# Patient Record
Sex: MALE | Race: WHITE | NOT HISPANIC OR LATINO | URBAN - METROPOLITAN AREA
[De-identification: names, ages, dates, MRNs, and addresses within clinical notes are randomized per-mention and may not be internally consistent; named-entity substitution may affect disease eponyms.]

---

## 2017-01-01 ENCOUNTER — OUTPATIENT (OUTPATIENT)
Dept: OUTPATIENT SERVICES | Facility: HOSPITAL | Age: 67
LOS: 1 days | Discharge: HOME | End: 2017-01-01

## 2017-01-01 ENCOUNTER — INPATIENT (INPATIENT)
Facility: HOSPITAL | Age: 67
LOS: 0 days | End: 2017-05-03
Attending: INTERNAL MEDICINE | Admitting: INTERNAL MEDICINE
Payer: COMMERCIAL

## 2017-01-01 VITALS
DIASTOLIC BLOOD PRESSURE: 30 MMHG | SYSTOLIC BLOOD PRESSURE: 54 MMHG | HEART RATE: 43 BPM | OXYGEN SATURATION: 88 % | RESPIRATION RATE: 12 BRPM

## 2017-01-01 VITALS
HEIGHT: 69 IN | WEIGHT: 154.98 LBS | DIASTOLIC BLOOD PRESSURE: 58 MMHG | OXYGEN SATURATION: 99 % | TEMPERATURE: 99 F | SYSTOLIC BLOOD PRESSURE: 96 MMHG | HEART RATE: 106 BPM | RESPIRATION RATE: 20 BRPM

## 2017-01-01 DIAGNOSIS — E78.5 HYPERLIPIDEMIA, UNSPECIFIED: ICD-10-CM

## 2017-01-01 DIAGNOSIS — G47.30 SLEEP APNEA, UNSPECIFIED: ICD-10-CM

## 2017-01-01 DIAGNOSIS — I21.4 NON-ST ELEVATION (NSTEMI) MYOCARDIAL INFARCTION: ICD-10-CM

## 2017-01-01 DIAGNOSIS — N40.0 BENIGN PROSTATIC HYPERPLASIA WITHOUT LOWER URINARY TRACT SYMPTOMS: ICD-10-CM

## 2017-01-01 DIAGNOSIS — I08.1 RHEUMATIC DISORDERS OF BOTH MITRAL AND TRICUSPID VALVES: ICD-10-CM

## 2017-01-01 DIAGNOSIS — R57.9 SHOCK, UNSPECIFIED: ICD-10-CM

## 2017-01-01 DIAGNOSIS — Z87.891 PERSONAL HISTORY OF NICOTINE DEPENDENCE: ICD-10-CM

## 2017-01-01 DIAGNOSIS — D68.2 HEREDITARY DEFICIENCY OF OTHER CLOTTING FACTORS: ICD-10-CM

## 2017-01-01 DIAGNOSIS — I25.10 ATHEROSCLEROTIC HEART DISEASE OF NATIVE CORONARY ARTERY WITHOUT ANGINA PECTORIS: ICD-10-CM

## 2017-01-01 DIAGNOSIS — Z86.718 PERSONAL HISTORY OF OTHER VENOUS THROMBOSIS AND EMBOLISM: ICD-10-CM

## 2017-01-01 DIAGNOSIS — I10 ESSENTIAL (PRIMARY) HYPERTENSION: ICD-10-CM

## 2017-01-01 DIAGNOSIS — I27.2 OTHER SECONDARY PULMONARY HYPERTENSION: ICD-10-CM

## 2017-01-01 LAB
ALBUMIN SERPL ELPH-MCNC: 3.5 G/DL — SIGNIFICANT CHANGE UP (ref 3.3–5)
ALP SERPL-CCNC: 94 U/L — SIGNIFICANT CHANGE UP (ref 40–120)
ALT FLD-CCNC: 40 U/L — SIGNIFICANT CHANGE UP (ref 12–78)
ANION GAP SERPL CALC-SCNC: 12 MMOL/L — SIGNIFICANT CHANGE UP (ref 5–17)
ANION GAP SERPL CALC-SCNC: 13 MMOL/L — SIGNIFICANT CHANGE UP (ref 5–17)
APTT BLD: 26.1 SEC — LOW (ref 27.5–37.4)
AST SERPL-CCNC: 27 U/L — SIGNIFICANT CHANGE UP (ref 15–37)
BASE EXCESS BLDA CALC-SCNC: -14 MMOL/L — LOW (ref -2–2)
BASE EXCESS BLDA CALC-SCNC: -4 MMOL/L — LOW (ref -2–2)
BASE EXCESS BLDA CALC-SCNC: -6 MMOL/L — LOW (ref -2–2)
BASOPHILS # BLD AUTO: 0.1 K/UL — SIGNIFICANT CHANGE UP (ref 0–0.2)
BASOPHILS NFR BLD AUTO: 0.3 % — SIGNIFICANT CHANGE UP (ref 0–2)
BILIRUB SERPL-MCNC: 1 MG/DL — SIGNIFICANT CHANGE UP (ref 0.2–1.2)
BLOOD GAS COMMENTS ARTERIAL: SIGNIFICANT CHANGE UP
BUN SERPL-MCNC: 45 MG/DL — HIGH (ref 7–23)
BUN SERPL-MCNC: 48 MG/DL — HIGH (ref 7–23)
CALCIUM SERPL-MCNC: 7.8 MG/DL — LOW (ref 8.5–10.1)
CALCIUM SERPL-MCNC: 9.3 MG/DL — SIGNIFICANT CHANGE UP (ref 8.5–10.1)
CHLORIDE SERPL-SCNC: 100 MMOL/L — SIGNIFICANT CHANGE UP (ref 96–108)
CHLORIDE SERPL-SCNC: 104 MMOL/L — SIGNIFICANT CHANGE UP (ref 96–108)
CK SERPL-CCNC: 121 U/L — SIGNIFICANT CHANGE UP (ref 26–308)
CK SERPL-CCNC: 48 U/L — SIGNIFICANT CHANGE UP (ref 26–308)
CO2 SERPL-SCNC: 24 MMOL/L — SIGNIFICANT CHANGE UP (ref 22–31)
CO2 SERPL-SCNC: 26 MMOL/L — SIGNIFICANT CHANGE UP (ref 22–31)
CREAT SERPL-MCNC: 1.41 MG/DL — HIGH (ref 0.5–1.3)
CREAT SERPL-MCNC: 1.66 MG/DL — HIGH (ref 0.5–1.3)
EOSINOPHIL # BLD AUTO: 0 K/UL — SIGNIFICANT CHANGE UP (ref 0–0.5)
EOSINOPHIL NFR BLD AUTO: 0 % — SIGNIFICANT CHANGE UP (ref 0–6)
GLUCOSE SERPL-MCNC: 162 MG/DL — HIGH (ref 70–99)
GLUCOSE SERPL-MCNC: 217 MG/DL — HIGH (ref 70–99)
HCO3 BLDA-SCNC: 20 MMOL/L — LOW (ref 21–29)
HCO3 BLDA-SCNC: 21 MMOL/L — SIGNIFICANT CHANGE UP (ref 21–29)
HCO3 BLDA-SCNC: 22 MMOL/L — SIGNIFICANT CHANGE UP (ref 21–29)
HCT VFR BLD CALC: 43 % — SIGNIFICANT CHANGE UP (ref 39–50)
HCT VFR BLD CALC: 46 % — SIGNIFICANT CHANGE UP (ref 39–50)
HGB BLD-MCNC: 14.7 G/DL — SIGNIFICANT CHANGE UP (ref 13–17)
HGB BLD-MCNC: 15.2 G/DL — SIGNIFICANT CHANGE UP (ref 13–17)
LACTATE SERPL-SCNC: 4.6 MMOL/L — CRITICAL HIGH (ref 0.7–2)
LACTATE SERPL-SCNC: 5.8 MMOL/L — CRITICAL HIGH (ref 0.7–2)
LYMPHOCYTES # BLD AUTO: 1 K/UL — SIGNIFICANT CHANGE UP (ref 1–3.3)
LYMPHOCYTES # BLD AUTO: 6 % — LOW (ref 13–44)
MAGNESIUM SERPL-MCNC: 2.3 MG/DL — SIGNIFICANT CHANGE UP (ref 1.8–2.4)
MCHC RBC-ENTMCNC: 31.8 PG — SIGNIFICANT CHANGE UP (ref 27–34)
MCHC RBC-ENTMCNC: 32.6 PG — SIGNIFICANT CHANGE UP (ref 27–34)
MCHC RBC-ENTMCNC: 33.2 GM/DL — SIGNIFICANT CHANGE UP (ref 32–36)
MCHC RBC-ENTMCNC: 34.2 GM/DL — SIGNIFICANT CHANGE UP (ref 32–36)
MCV RBC AUTO: 95.2 FL — SIGNIFICANT CHANGE UP (ref 80–100)
MCV RBC AUTO: 96 FL — SIGNIFICANT CHANGE UP (ref 80–100)
MONOCYTES # BLD AUTO: 0.8 K/UL — SIGNIFICANT CHANGE UP (ref 0–0.9)
MONOCYTES NFR BLD AUTO: 4.5 % — SIGNIFICANT CHANGE UP (ref 2–14)
NEUTROPHILS # BLD AUTO: 15.1 K/UL — HIGH (ref 1.8–7.4)
NEUTROPHILS NFR BLD AUTO: 89.2 % — HIGH (ref 43–77)
NT-PROBNP SERPL-SCNC: HIGH PG/ML (ref 0–125)
PCO2 BLDA: 42 MMHG — SIGNIFICANT CHANGE UP (ref 32–46)
PCO2 BLDA: 53 MMHG — HIGH (ref 32–46)
PCO2 BLDA: 80 MMHG — CRITICAL HIGH (ref 32–46)
PH BLDA: 7.02 — CRITICAL LOW (ref 7.35–7.45)
PH BLDA: 7.24 — LOW (ref 7.35–7.45)
PH BLDA: 7.32 — LOW (ref 7.35–7.45)
PLATELET # BLD AUTO: 238 K/UL — SIGNIFICANT CHANGE UP (ref 150–400)
PLATELET # BLD AUTO: 245 K/UL — SIGNIFICANT CHANGE UP (ref 150–400)
PO2 BLDA: 170 — SIGNIFICANT CHANGE UP
PO2 BLDA: 432 — SIGNIFICANT CHANGE UP
PO2 BLDA: 58 — SIGNIFICANT CHANGE UP
POTASSIUM SERPL-MCNC: 4.1 MMOL/L — SIGNIFICANT CHANGE UP (ref 3.5–5.3)
POTASSIUM SERPL-MCNC: 4.5 MMOL/L — SIGNIFICANT CHANGE UP (ref 3.5–5.3)
POTASSIUM SERPL-SCNC: 4.1 MMOL/L — SIGNIFICANT CHANGE UP (ref 3.5–5.3)
POTASSIUM SERPL-SCNC: 4.5 MMOL/L — SIGNIFICANT CHANGE UP (ref 3.5–5.3)
PROT SERPL-MCNC: 6.8 GM/DL — SIGNIFICANT CHANGE UP (ref 6–8.3)
RBC # BLD: 4.51 M/UL — SIGNIFICANT CHANGE UP (ref 4.2–5.8)
RBC # BLD: 4.79 M/UL — SIGNIFICANT CHANGE UP (ref 4.2–5.8)
RBC # FLD: 13.4 % — SIGNIFICANT CHANGE UP (ref 10.3–14.5)
RBC # FLD: 13.5 % — SIGNIFICANT CHANGE UP (ref 10.3–14.5)
SAO2 % BLDA: 73 — SIGNIFICANT CHANGE UP
SAO2 % BLDA: 98 — SIGNIFICANT CHANGE UP
SAO2 % BLDA: 99 — SIGNIFICANT CHANGE UP
SODIUM SERPL-SCNC: 138 MMOL/L — SIGNIFICANT CHANGE UP (ref 135–145)
SODIUM SERPL-SCNC: 141 MMOL/L — SIGNIFICANT CHANGE UP (ref 135–145)
TROPONIN I SERPL-MCNC: 4.17 NG/ML — HIGH (ref 0.01–0.04)
TROPONIN I SERPL-MCNC: 4.27 NG/ML — HIGH (ref 0.01–0.04)
TROPONIN I SERPL-MCNC: 5.43 NG/ML — HIGH (ref 0.01–0.04)
WBC # BLD: 17 K/UL — HIGH (ref 3.8–10.5)
WBC # BLD: 31.2 K/UL — HIGH (ref 3.8–10.5)
WBC # FLD AUTO: 17 K/UL — HIGH (ref 3.8–10.5)
WBC # FLD AUTO: 31.2 K/UL — HIGH (ref 3.8–10.5)

## 2017-01-01 PROCEDURE — 93306 TTE W/DOPPLER COMPLETE: CPT | Mod: 26

## 2017-01-01 PROCEDURE — 71010: CPT | Mod: 26,77

## 2017-01-01 PROCEDURE — 74000: CPT | Mod: 26

## 2017-01-01 PROCEDURE — 93010 ELECTROCARDIOGRAM REPORT: CPT

## 2017-01-01 PROCEDURE — 71275 CT ANGIOGRAPHY CHEST: CPT | Mod: 26

## 2017-01-01 PROCEDURE — 31500 INSERT EMERGENCY AIRWAY: CPT

## 2017-01-01 PROCEDURE — 71010: CPT | Mod: 26,76

## 2017-01-01 PROCEDURE — 99291 CRITICAL CARE FIRST HOUR: CPT | Mod: 25

## 2017-01-01 RX ORDER — DOBUTAMINE HCL 250MG/20ML
10 VIAL (ML) INTRAVENOUS
Qty: 500 | Refills: 0 | Status: DISCONTINUED | OUTPATIENT
Start: 2017-01-01 | End: 2017-01-01

## 2017-01-01 RX ORDER — PANTOPRAZOLE SODIUM 20 MG/1
8 TABLET, DELAYED RELEASE ORAL
Qty: 80 | Refills: 0 | Status: DISCONTINUED | OUTPATIENT
Start: 2017-01-01 | End: 2017-01-01

## 2017-01-01 RX ORDER — POTASSIUM CHLORIDE 20 MEQ
20 PACKET (EA) ORAL
Qty: 0 | Refills: 0 | Status: DISCONTINUED | OUTPATIENT
Start: 2017-01-01 | End: 2017-01-01

## 2017-01-01 RX ORDER — ASPIRIN/CALCIUM CARB/MAGNESIUM 324 MG
81 TABLET ORAL DAILY
Qty: 0 | Refills: 0 | Status: DISCONTINUED | OUTPATIENT
Start: 2017-01-01 | End: 2017-01-01

## 2017-01-01 RX ORDER — FUROSEMIDE 40 MG
1 TABLET ORAL
Qty: 0 | Refills: 0 | COMMUNITY

## 2017-01-01 RX ORDER — ACETAMINOPHEN 500 MG
650 TABLET ORAL EVERY 6 HOURS
Qty: 0 | Refills: 0 | Status: DISCONTINUED | OUTPATIENT
Start: 2017-01-01 | End: 2017-01-01

## 2017-01-01 RX ORDER — FENTANYL CITRATE 50 UG/ML
2 INJECTION INTRAVENOUS
Qty: 2500 | Refills: 0 | Status: DISCONTINUED | OUTPATIENT
Start: 2017-01-01 | End: 2017-01-01

## 2017-01-01 RX ORDER — EPINEPHRINE 0.3 MG/.3ML
0.05 INJECTION INTRAMUSCULAR; SUBCUTANEOUS
Qty: 4 | Refills: 0 | Status: DISCONTINUED | OUTPATIENT
Start: 2017-01-01 | End: 2017-01-01

## 2017-01-01 RX ORDER — PIPERACILLIN AND TAZOBACTAM 4; .5 G/20ML; G/20ML
3.38 INJECTION, POWDER, LYOPHILIZED, FOR SOLUTION INTRAVENOUS ONCE
Qty: 0 | Refills: 0 | Status: COMPLETED | OUTPATIENT
Start: 2017-01-01 | End: 2017-01-01

## 2017-01-01 RX ORDER — SODIUM CHLORIDE 9 MG/ML
1000 INJECTION, SOLUTION INTRAVENOUS
Qty: 0 | Refills: 0 | Status: DISCONTINUED | OUTPATIENT
Start: 2017-01-01 | End: 2017-01-01

## 2017-01-01 RX ORDER — DEXTROSE 50 % IN WATER 50 %
25 SYRINGE (ML) INTRAVENOUS ONCE
Qty: 0 | Refills: 0 | Status: DISCONTINUED | OUTPATIENT
Start: 2017-01-01 | End: 2017-01-01

## 2017-01-01 RX ORDER — PIPERACILLIN AND TAZOBACTAM 4; .5 G/20ML; G/20ML
3.38 INJECTION, POWDER, LYOPHILIZED, FOR SOLUTION INTRAVENOUS EVERY 8 HOURS
Qty: 0 | Refills: 0 | Status: DISCONTINUED | OUTPATIENT
Start: 2017-01-01 | End: 2017-01-01

## 2017-01-01 RX ORDER — HEPARIN SODIUM 5000 [USP'U]/ML
3000 INJECTION INTRAVENOUS; SUBCUTANEOUS EVERY 6 HOURS
Qty: 0 | Refills: 0 | Status: DISCONTINUED | OUTPATIENT
Start: 2017-01-01 | End: 2017-01-01

## 2017-01-01 RX ORDER — ASPIRIN/CALCIUM CARB/MAGNESIUM 324 MG
1 TABLET ORAL
Qty: 0 | Refills: 0 | COMMUNITY

## 2017-01-01 RX ORDER — HEPARIN SODIUM 5000 [USP'U]/ML
6500 INJECTION INTRAVENOUS; SUBCUTANEOUS ONCE
Qty: 0 | Refills: 0 | Status: COMPLETED | OUTPATIENT
Start: 2017-01-01 | End: 2017-01-01

## 2017-01-01 RX ORDER — DEXTROSE 50 % IN WATER 50 %
12.5 SYRINGE (ML) INTRAVENOUS ONCE
Qty: 0 | Refills: 0 | Status: DISCONTINUED | OUTPATIENT
Start: 2017-01-01 | End: 2017-01-01

## 2017-01-01 RX ORDER — HEPARIN SODIUM 5000 [USP'U]/ML
6500 INJECTION INTRAVENOUS; SUBCUTANEOUS EVERY 6 HOURS
Qty: 0 | Refills: 0 | Status: DISCONTINUED | OUTPATIENT
Start: 2017-01-01 | End: 2017-01-01

## 2017-01-01 RX ORDER — PANTOPRAZOLE SODIUM 20 MG/1
80 TABLET, DELAYED RELEASE ORAL ONCE
Qty: 0 | Refills: 0 | Status: DISCONTINUED | OUTPATIENT
Start: 2017-01-01 | End: 2017-01-01

## 2017-01-01 RX ORDER — CHLORHEXIDINE GLUCONATE 213 G/1000ML
15 SOLUTION TOPICAL
Qty: 0 | Refills: 0 | Status: DISCONTINUED | OUTPATIENT
Start: 2017-01-01 | End: 2017-01-01

## 2017-01-01 RX ORDER — HEPARIN SODIUM 5000 [USP'U]/ML
INJECTION INTRAVENOUS; SUBCUTANEOUS
Qty: 25000 | Refills: 0 | Status: DISCONTINUED | OUTPATIENT
Start: 2017-01-01 | End: 2017-01-01

## 2017-01-01 RX ORDER — DEXTROSE 50 % IN WATER 50 %
1 SYRINGE (ML) INTRAVENOUS ONCE
Qty: 0 | Refills: 0 | Status: DISCONTINUED | OUTPATIENT
Start: 2017-01-01 | End: 2017-01-01

## 2017-01-01 RX ORDER — DOCUSATE SODIUM 100 MG
100 CAPSULE ORAL THREE TIMES A DAY
Qty: 0 | Refills: 0 | Status: DISCONTINUED | OUTPATIENT
Start: 2017-01-01 | End: 2017-01-01

## 2017-01-01 RX ORDER — FUROSEMIDE 40 MG
40 TABLET ORAL ONCE
Qty: 0 | Refills: 0 | Status: COMPLETED | OUTPATIENT
Start: 2017-01-01 | End: 2017-01-01

## 2017-01-01 RX ORDER — NOREPINEPHRINE BITARTRATE/D5W 8 MG/250ML
0.01 PLASTIC BAG, INJECTION (ML) INTRAVENOUS
Qty: 8 | Refills: 0 | Status: DISCONTINUED | OUTPATIENT
Start: 2017-01-01 | End: 2017-01-01

## 2017-01-01 RX ORDER — GLUCAGON INJECTION, SOLUTION 0.5 MG/.1ML
1 INJECTION, SOLUTION SUBCUTANEOUS ONCE
Qty: 0 | Refills: 0 | Status: DISCONTINUED | OUTPATIENT
Start: 2017-01-01 | End: 2017-01-01

## 2017-01-01 RX ORDER — FUROSEMIDE 40 MG
40 TABLET ORAL EVERY 8 HOURS
Qty: 0 | Refills: 0 | Status: DISCONTINUED | OUTPATIENT
Start: 2017-01-01 | End: 2017-01-01

## 2017-01-01 RX ORDER — ONDANSETRON 8 MG/1
4 TABLET, FILM COATED ORAL EVERY 6 HOURS
Qty: 0 | Refills: 0 | Status: DISCONTINUED | OUTPATIENT
Start: 2017-01-01 | End: 2017-01-01

## 2017-01-01 RX ORDER — INSULIN LISPRO 100/ML
VIAL (ML) SUBCUTANEOUS EVERY 6 HOURS
Qty: 0 | Refills: 0 | Status: DISCONTINUED | OUTPATIENT
Start: 2017-01-01 | End: 2017-01-01

## 2017-01-01 RX ADMIN — Medication 1.55 MICROGRAM(S)/KG/MIN: at 02:26

## 2017-01-01 RX ADMIN — Medication 1.55 MICROGRAM(S)/KG/MIN: at 23:29

## 2017-01-01 RX ADMIN — Medication 2 MILLIGRAM(S): at 22:15

## 2017-01-01 RX ADMIN — Medication 81 MILLIGRAM(S): at 17:39

## 2017-01-01 RX ADMIN — Medication 40 MILLIGRAM(S): at 23:43

## 2017-01-01 RX ADMIN — PIPERACILLIN AND TAZOBACTAM 200 GRAM(S): 4; .5 INJECTION, POWDER, LYOPHILIZED, FOR SOLUTION INTRAVENOUS at 02:05

## 2017-01-01 RX ADMIN — PANTOPRAZOLE SODIUM 10 MG/HR: 20 TABLET, DELAYED RELEASE ORAL at 23:32

## 2017-01-01 RX ADMIN — Medication 1.55 MICROGRAM(S)/KG/MIN: at 04:08

## 2017-01-01 RX ADMIN — Medication 40 MILLIGRAM(S): at 15:45

## 2017-01-01 RX ADMIN — EPINEPHRINE 15.49 MICROGRAM(S)/KG/MIN: 0.3 INJECTION INTRAMUSCULAR; SUBCUTANEOUS at 04:09

## 2017-01-01 RX ADMIN — FENTANYL CITRATE 16.52 MICROGRAM(S)/KG/HR: 50 INJECTION INTRAVENOUS at 23:53

## 2017-01-01 RX ADMIN — Medication 21.09 MICROGRAM(S)/KG/MIN: at 17:43

## 2017-01-01 RX ADMIN — Medication 2 MILLIGRAM(S): at 23:31

## 2017-05-02 NOTE — CONSULT NOTE ADULT - SUBJECTIVE AND OBJECTIVE BOX
Called to see the patient in the ED post intubation and brief cardiac arrest.      67Y/O male with prior medical hx of Cardiac amyloid, HTN, CADEN presented with worsening SOB, WELLINGTON and pedal edema the 3 days.  In the ED he was seen by cardio and had an ECHO was done showing an EF of 40%, PFO, and signs of right sided heart failure.  A STAT CTA was done and showed large B/L Plural effusions but no PE.  Patient also noted to be in AFIB in the ED.  The patient was started on Dobutrex by cardio in the ED.  Shortly after he became tachy and hypotensive and he required intubation.  Shortly after intubation he went chris and then into asystole and required a brief period of CPR.  Then upon my arrival at about 8:45 he again became chris, hypoxic and coded.  After a brief period of CPR, 2 EPI, 2 Bicarb he regained a pulse.  Upon arrival to the CCU he was moving his upper extremities.  He remained hypotensive and required Levophed that was initially started in the ED.   I called the patients daughter Rika to update her and obtain consent for a CVL.  She is coming down from [place].      PMH: As above  PSH: Knee, Heart Bx  SH:   All: NKDA

## 2017-05-02 NOTE — ED PROCEDURE NOTE - PROCEDURE ADDITIONAL DETAILS
25 cm at lip. Uncomplicated, no bleeding, 1st pass attempt. 100 succinylcholine, 20 etomidate. 25 cm at lip. Uncomplicated, no bleeding, 1st pass attempt.

## 2017-05-02 NOTE — CONSULT NOTE ADULT - ASSESSMENT
66 male with possible cardiac amyloid who is S/P cardiac arrest from and arrhythmia ve hypoxic respiratory failure, and acidosis, hypotension    Plan:  CCU    PULM: In Pulmonary edema, Diuresis as BP tolerates    CARDIO: Stopped Dobutrex because of tachycardia and hypotension.  Follow Anamaria.  Cardio to F/U in AM.  Diuresis as BP tolerates.  D/C UFH for UGIB and patient in NSR.     Renal: Follow rising Ct. Send a myogloubin.  Watch for CHELSEA given contrast load--Can not hydrate given CHF.     GI: Blood from the OGT--PPI Drip.  May need to be seen by GI.  Distended--Hook up to LWS.  Repeat lactate in AM--IF it remaind elevated Surgery may need to be called and a CT A/P with PO contrast    ID: No evidence he is infected.      Venodynes, No SQH at this time

## 2017-05-02 NOTE — PROCEDURE NOTE - NSPROCDETAILS_GEN_ALL_CORE
lumen(s) aspirated and flushed/sterile dressing applied/sterile technique, catheter placed/ultrasound guidance/guidewire recovered

## 2017-05-02 NOTE — H&P ADULT - NSHPLABSRESULTS_GEN_ALL_CORE
14.7   17.0  )-----------( 245      ( 02 May 2017 14:36 )             43.0     02 May 2017 14:36    138    |  100    |  45     ----------------------------<  162    4.5     |  26     |  1.41     Ca    9.3        02 May 2017 14:36  Mg     2.3       02 May 2017 14:36    TPro  6.8    /  Alb  3.5    /  TBili  1.0    /  DBili  x      /  AST  27     /  ALT  40     /  AlkPhos  94     02 May 2017 14:36    LIVER FUNCTIONS - ( 02 May 2017 14:36 )  Alb: 3.5 g/dL / Pro: 6.8 gm/dL / ALK PHOS: 94 U/L / ALT: 40 U/L / AST: 27 U/L / GGT: x         CAPILLARY BLOOD GLUCOSE  CARDIAC MARKERS ( 02 May 2017 14:36 )  4.170 ng/mL / x     / 48 U/L / x     / x

## 2017-05-02 NOTE — CONSULT NOTE ADULT - ASSESSMENT
Acute on chronic decompensated HFrEF, BiV failure- complicated with worse renal function, likely cardiorenal syndrome.  Already received lasix iv 40mg, BP low at this time.  Will admit to CCU.  dobutamine drip 10mcg/kg/min, lasix to be given one hour later, iv 40mg once.  Strict I/O and daily wt checks. Low sodium diet. Nutrition education.  Close monitoring of electorlytes and renal function.  Dr Umaña informing heart failure team at [facility].  Hold off all meds that can effect his BP.    Atrial flutter- hold off on cardioversion tonight since he is decompensated.  Start heaprindrip for full dose anticoagulation.  thank you. Acute on chronic decompensated HFrEF, BiV failure- complicated with worse renal function, likely cardiorenal syndrome.  Already received lasix iv 40mg, BP low at this time.  Will admit to CCU.  dobutamine drip 10mcg/kg/min, lasix to be given one hour later, iv 40mg once.  Strict I/O and daily wt checks. Low sodium diet. Nutrition education.  Close monitoring of electorlytes and renal function.  Dr Umaña informing heart failure team at [facility].  Hold off all meds that can effect his BP.    Elevated troponins- likely from demand from heart failure or NSTEMI- started on heparin drip already.  Will reasses in am.    Atrial flutter- hold off on cardioversion tonight since he is decompensated.  Start heaprin drip for full dose anticoagulation.  thank you.

## 2017-05-02 NOTE — ED PROVIDER NOTE - CARDIAC, MLM
Normal rate, regular rhythm.  Heart sounds S1, S2.  No murmurs, rubs or gallops. 3+ pitting edema to bilateral LE.

## 2017-05-02 NOTE — ED PROVIDER NOTE - PROGRESS NOTE DETAILS
Discussed case with pt's hem/onc Dr. Goodson. Feels etiology is cardiac in nature. I, Radha Masterson, am scribing for and in the presence of Dr. Glasgow for the following progress note: Pt signed out by Dr. Marrufo. Feeling improved. Bedside US shows poor contractility with thickened ventricular walls.  Elevated troponin and elevated lactate. Concern for cardiogenic cause of lab values. Will consult with Dr. Umaña. I, Radha Masterson, am scribing for and in the presence of Dr. Glasgow for the following progress note. Last EF was 50% in December with normal heart catheterization. IV Lasix recommended at this time. Also recommends cardiac stepdown admission. Dr. Umaña states he Will reach out to pt's specialists at [facility] for further recommendations. AJM: Pt comfortable. sighed out to José AJM": pt needs emergent CTA. benefit outweighs risk from GFR or Cr. Radha VILLALTA, am scribing for and in the presence of Dr. Glasgow for the following progress note. Called to pt bedside. Pt with decreased responsiveness, tachycardic, hypoxic shortly after dobutamine infusion. Infusion stopped. Pt emergently taken to trauma intubated with 100 sux, 20 etomidate. Refer to intubation note. Pt in cardiac arrest for 2 min, 1 round of epi administered; ROSC achieved. Pt taken to CT to r/o PE. ICU aware of pt. Currently hemodynamically stable. Pt with distended abdomen.  NG tube placed without complications. CXR ordered. Spoke with pt's girlfriend who is not at bedside. Spoke with ICU Dr. Montiel. Aware of pt, will come see pt. AJM: spoke ith pts daughter shade samson  who is aware of the critical nature of her father Spoke with ICU Dr. Montiel. Aware of pt, will come see pt.  20:40 ICU Dr. Montiel in ED to evaluate pt.   21:01 pt with worsening bradycardia in 20's. CPR initiated, 2 bicarbs, 2 epis, ROSC achieved, CPR ceased. Spoke with pt's girlfriend who is  at bedside. AJM: spoke with pts daughter shade samson  who is aware of the critical nature of her father Spoke with ICU Dr. Montiel. Aware of pt, will come see pt.  20:40 ICU Dr. Montiel in ED to evaluate pt.   21:01 pt with worsening bradycardia in 20's. CPR initiated, 2 bicarbs, 2 epis, ROSC achieved, CPR ceased. ROSC achieved. VSS I, Radha Masterson, am scribing for and in the presence of Dr. Glasgow for the following progress note. Case presented to Dr parisi. He notes Last EF was 50% in December with normal heart catheterization. IV Lasix recommended at this time. Also recommends cardiac stepdown admission. Dr. Umaña states he Will reach out to pt's specialists at [facility] for further recommendations. I, Radha Masterson, am scribing for and in the presence of Dr. Glasgow for the following progress note. Called to pt bedside after pt was already admitted. Dobutamine ordered by in patient team. Shortly after starting medication Pt with decreased responsiveness, tachycardic, hypoxic, Infusion stopped. Pt emergently taken to trauma room. VS continued to worsen and pts lips notes to be cyanotic with minimal responsiveness. Pt emergently intubated with 100 sux, 20 etomidate. Refer to intubation note. Pt then proceeded in cardiac arrest for 2 min, 1 round of epi administered; ROSC achieved. Pt taken to CT to r/o PE. ICU aware of pt. Currently hemodynamically stable. Placed on propofol and fentanyl as he began moving all extremities and fighting the vent. Pt with distended abdomen.  NG tube placed without complications. CXR ordered. No evidence of PE on CT, just large pulm edema b/l Spoke with pt's girlfriend who is  at bedside regarding pts condition. she recommended calling his daughter Spoke with ICU Dr. Montiel. Aware of pt, will come see pt.  20:40 ICU Dr. Montiel in ED to evaluate pt.   21:01 pt with worsening bradycardia in 20's, hypotension to the 60s systolic. Sedation stopped. Pt then became pulseless. CPR initiated, 2 bicarbs, 2 epis, ROSC achieved with code lasting less than 5 min. Norepi drip initiated. VS improved and remained stable as patient transported to CCU. Dr Montiel from ICU present for code for assistance. Spoke with ICU Dr. Montiel. Aware of pt, will come see pt.  20:40 ICU Dr. Montiel in ED to evaluate pt.   21:01 pt with worsening bradycardia in 20's, hypotension to the 60s systolic. Sedation stopped. Pt then became pulseless. CPR initiated, 2 bicarbs, 2 epis, ROSC achieved with code lasting less than 5 min. Norepi drip initiated. VS improved and remained stable as patient transported to CCU. Dr Montiel from ICU present for code for assistance.  Case discussed with cards consult who had no further recommendations for patient's care. AJM: spoke with pts daughter shade samson  who is aware of the critical nature of her father    Radha Masterson was my scribe. The scribe's documentation has been prepared under my direction and personally reviewed by me in its entirety. I confirm that the note above accurately reflects all work, treatment, procedures, and medical decision making performed by me.

## 2017-05-02 NOTE — ED PROVIDER NOTE - RESPIRATORY, MLM
Breath sounds clear and equal bilaterally. Breath sounds clear and equal bilaterally. no sentence shortening.

## 2017-05-02 NOTE — ED PROVIDER NOTE - NS ED MD SCRIBE ATTENDING SCRIBE SECTIONS
CONSULTATIONS/SHIFT CHANGE/PAST MEDICAL/SURGICAL/SOCIAL HISTORY/DISPOSITION/HISTORY OF PRESENT ILLNESS/PROGRESS NOTE/PHYSICAL EXAM/RESULTS/REVIEW OF SYSTEMS

## 2017-05-02 NOTE — H&P ADULT - HISTORY OF PRESENT ILLNESS
67yo/M with PMH chr systolic CHF, amyloidosis presented for worsening SOB. Denies chest pain or palpitations. Found in CHF and aflutter

## 2017-05-02 NOTE — ED PROVIDER NOTE - OBJECTIVE STATEMENT
65 y/o M with a hx of amyloidosis (being treated at [facility] twice a week, most recent yesterday), CHF, on Lasix presents to ED c/o SOB, WELLINGTON x3 days. Worsening LE swelling x2 days. Denies testicular swelling, fever, CP, abd pain, n/v/d, constipation On Sunday, pt collapsed and was very short of breath, resolved after a few minutes, did not go to ER. Cardio ([place])- Hamzah [phone]. Cardio ([facility])- Chasidy. 67 y/o M with a hx of amyloidosis (being treated at [facility] twice a week, most recent yesterday), CHF, on Lasix presents to ED c/o SOB, WELLINGTON x3 days. Worsening LE swelling x2 days. Denies testicular swelling, fever, CP, abd pain, n/v/d, constipation On Saturday, pt collapsed and was very short of breath, resolved after a few minutes, did not go to ER. Hem/Onc([place])- Hamzah [[phone]]. Cardio - Pap[place]o. 65 y/o M with a hx of amyloidosis (being treated at [place] twice a week, most recent yesterday), CHF, on Lasix presents to ED c/o SOB, WELLINGTON x3 days. Worsening LE swelling x2 days. Denies testicular swelling, fever, CP, abd pain, n/v/d, constipation On Saturday, pt collapsed and was very short of breath, resolved after a few minutes, did not go to ER. Hem/Onc([place])- Hamzah [[phone]]. Cardio - [place].

## 2017-05-02 NOTE — H&P ADULT - NSHPPHYSICALEXAM_GEN_ALL_CORE
Vital Signs Last 24 Hrs  T(C): 37, Max: 37 (05-02 @ 14:32)  T(F): 98.6, Max: 98.6 (05-02 @ 14:32)  HR: 106 (103 - 106)  BP: 102/75 (82/63 - 105/78)  BP(mean): --  RR: 22 (20 - 23)  SpO2: 97% (96% - 99%)

## 2017-05-02 NOTE — ED ADULT NURSE REASSESSMENT NOTE - NS ED NURSE REASSESS COMMENT FT1
Dr. Glasgow at bedside- patient in aflutter on monitor hr 124, patient lethargic, nonresponsive. patient to be intubated. etomidate 20, 100mg succ,, 7.5 cm 25 @the lip.
NG tube placed by dr. mclaughlin. myers cath placed for output measure.
hypotension,  notiified, fentanyl and proprofol d/c. dr. newell at bedside. order for sodium bicarb x 2. patient bradycardic 50s then 40's. 1 epi given- as per verbal order by dr. newell, +pulse present.
no pulse, dr. newell at bedside, CPR started.
patient in afib +PULSE hr 91. patient started on proprofol drip for sedation and taken to CT scan to r/o PE.
patient rhythm asystole, NO PULSE, cpr started, epi given.
patient heart rate increased to 140s then 170s, back to 140s. patient verbalizes he doesn't "feel right"- dobutamine drip discontinued. TOTAL INFUSION WHEN DOBUTAMINE DISCONTINUED 2.4 ML (read total volume on pump). heart rate 120s and patient verbalizes "feels a little better". patient placed on 2L nc, patient diaphoretic, states he wants to use the urinal- patient had episode of incontinence. Oxygen saturation dropped to 80% 100%nonrebreather applied- dr. mclaughlin notified and patient was moved to trauma bed for re-evaluation and possible intubation.
patient regained pulse. norepinephrine started as per verbal order dr. newell 1 mcg/kg/min. after 1st bp- 151/103 lowered to 0.5 mcg/kg/min.

## 2017-05-02 NOTE — ED PROVIDER NOTE - CRITICAL CARE PROVIDED
documentation/consultation with other physicians/direct patient care (not related to procedure)/interpretation of diagnostic studies/additional history taking

## 2017-05-02 NOTE — H&P ADULT - ASSESSMENT
#CHF acute on chronic decompensated  Admit to CCU  Card sia Lizama  IV Lasix  Dobutamine drip as per cardiology  Monitor lytes    #Aflutter  IV Heparin drip  Rate controlled    #Amyloidosis  Resume chemo once stabilized and out of the hospital    #Dispo- inpatient admit

## 2017-05-02 NOTE — CONSULT NOTE ADULT - SUBJECTIVE AND OBJECTIVE BOX
Patient is a 66y old  Male who presents with a chief complaint of SOB.    HPI:66yom with prior medical hx of Cardiac amyloid, HTN, CADEN presented with worsening SOB and WELLINGTON and worsening pedal edema in last 3 days more than his baseline.  No orthopnea or PND, using CPAP at night.      PAST MEDICAL & SURGICAL HISTORY:  Amyloidosis  CHF (congestive heart failure)  No significant past surgical history      MEDICATIONS  (STANDING):  furosemide   Injectable 40milliGRAM(s) IV Push Once    MEDICATIONS  (PRN):      FAMILY HISTORY:  No family history of premature CAD or SCD.        SOCIAL HISTORY:  non smoker, no alcohol use      REVIEW OF SYSTEMS:  CONSTITUTIONAL:  No night sweats.  No fatigue, malaise, lethargy.  No fever or chills.  HEENT:  Eyes:  No visual changes.  No eye pain.      ENT:  No runny nose.  No epistaxis.  No sinus pain.  No sore throat.  No odynophagia.  No ear pain.  No congestion.  RESPIRATORY:  No cough.  No wheeze.  No hemoptysis.  c/o shortness of breath.  CARDIOVASCULAR:  No chest pains.  No palpitations. c/o  shortness of breath, No orthopnea or PND. c/o pedal edema  GASTROINTESTINAL:  No abdominal pain.  No nausea or vomiting.  No diarrhea or constipation.  No hematemesis.  No hematochezia.  No melena.  GENITOURINARY:  No urgency.  No frequency.  No dysuria.  No hematuria.  No obstructive symptoms.  No discharge.  No pain.  No significant abnormal bleeding.  MUSCULOSKELETAL:  No musculoskeletal pain.  No joint swelling.  No arthritis.  NEUROLOGICAL:  No tingling or numbness or weakness.  PSYCHIATRIC:  No confusion  SKIN:  No rashes.  No lesions.  No wounds.  ENDOCRINE:  No unexplained weight loss.  No polydipsia.  No polyuria.  No polyphagia.  HEMATOLOGIC:  No anemia.  No purpura.  No petechiae.  No prolonged or excessive bleeding.   ALLERGIC AND IMMUNOLOGIC:  No pruritus.  No swelling.         Vital Signs Last 24 Hrs  T(C): 37, Max: 37 (05-02 @ 14:32)  T(F): 98.6, Max: 98.6 (05-02 @ 14:32)  HR: 106 (106 - 106)  BP: 96/58 (96/58 - 96/58)  BP(mean): --  RR: 20 (20 - 20)  SpO2: 99% (99% - 99%)    PHYSICAL EXAM-    Constitutional: The patient appears to be normal, well developed, well nourished and alert and oriented to time, place and person. The patient  appears acutely ill.     Head: Head is normocephalic and atraumatic.      Neck: The patient's neck is supple without enlargement, has no palpable thyromegaly nor thyroid nodules and has no jugular venous distention. No audible carotid bruits. There are strong carotid pulses bilaterally. positive JVD    Cardiovascular: Regular rate and rhythm without S3, S4. No murmurs or rubs are appreciated.      Respiratory: crackles b/l basal    Abdomen: Soft, nontender, nondistended with positive bowel sounds.      Extremity: 1-2+ pitting pedal edema    Neurologic: The patient is alert and oriented.      Skin: No rash, no obvious lesions noted.      Psychiatric: The patient appears to be emotionally stable.      INTERPRETATION OF TELEMETRY:     ECG: sinsu tachycardia     I&O's Detail      LABS:                        14.7   17.0  )-----------( 245      ( 02 May 2017 14:36 )             43.0     05-02    138  |  100  |  45<H>  ----------------------------<  162<H>  4.5   |  26  |  1.41<H>    Ca    9.3      02 May 2017 14:36  Mg     2.3     05-02    TPro  6.8  /  Alb  3.5  /  TBili  1.0  /  DBili  x   /  AST  27  /  ALT  40  /  AlkPhos  94  05-02    CARDIAC MARKERS ( 02 May 2017 14:36 )  4.170 ng/mL / x     / 48 U/L / x     / x              I&O's Summary    BNPSerum Pro-Brain Natriuretic Peptide: 13451 pg/mL (05-02 @ 14:36)    RADIOLOGY & ADDITIONAL STUDIES: Patient is a 66y old  Male who presents with a chief complaint of SOB.    HPI:66yom with prior medical hx of Cardiac amyloid, HTN, CADEN presented with worsening SOB and WELLINGTON and worsening pedal edema in last 3 days more than his baseline.  No orthopnea or PND, using CPAP at night.      PAST MEDICAL & SURGICAL HISTORY:  Amyloidosis  CHF (congestive heart failure)  No significant past surgical history      MEDICATIONS  (STANDING):  furosemide   Injectable 40milliGRAM(s) IV Push Once    MEDICATIONS  (PRN):      FAMILY HISTORY:  No family history of premature CAD or SCD.        SOCIAL HISTORY:  non smoker, no alcohol use      REVIEW OF SYSTEMS:  CONSTITUTIONAL:  No night sweats.  No fatigue, malaise, lethargy.  No fever or chills.  HEENT:  Eyes:  No visual changes.  No eye pain.      ENT:  No runny nose.  No epistaxis.  No sinus pain.  No sore throat.  No odynophagia.  No ear pain.  No congestion.  RESPIRATORY:  No cough.  No wheeze.  No hemoptysis.  c/o shortness of breath.  CARDIOVASCULAR:  No chest pains.  No palpitations. c/o  shortness of breath, No orthopnea or PND. c/o pedal edema  GASTROINTESTINAL:  No abdominal pain.  No nausea or vomiting.  No diarrhea or constipation.  No hematemesis.  No hematochezia.  No melena.  GENITOURINARY:  No urgency.  No frequency.  No dysuria.  No hematuria.  No obstructive symptoms.  No discharge.  No pain.  No significant abnormal bleeding.  MUSCULOSKELETAL:  No musculoskeletal pain.  No joint swelling.  No arthritis.  NEUROLOGICAL:  No tingling or numbness or weakness.  PSYCHIATRIC:  No confusion  SKIN:  No rashes.  No lesions.  No wounds.  ENDOCRINE:  No unexplained weight loss.  No polydipsia.  No polyuria.  No polyphagia.  HEMATOLOGIC:  No anemia.  No purpura.  No petechiae.  No prolonged or excessive bleeding.   ALLERGIC AND IMMUNOLOGIC:  No pruritus.  No swelling.         Vital Signs Last 24 Hrs  T(C): 37, Max: 37 (05-02 @ 14:32)  T(F): 98.6, Max: 98.6 (05-02 @ 14:32)  HR: 106 (106 - 106)  BP: 96/58 (96/58 - 96/58)  BP(mean): --  RR: 20 (20 - 20)  SpO2: 99% (99% - 99%)    PHYSICAL EXAM-    Constitutional: The patient appears to be normal, well developed, well nourished and alert and oriented to time, place and person. The patient  appears acutely ill.     Head: Head is normocephalic and atraumatic.      Neck: The patient's neck is supple without enlargement, has no palpable thyromegaly nor thyroid nodules and has no jugular venous distention. No audible carotid bruits. There are strong carotid pulses bilaterally. positive JVD    Cardiovascular: Regular rate and rhythm without S3, S4. No murmurs or rubs are appreciated.      Respiratory: crackles b/l basal    Abdomen: Soft, nontender, nondistended with positive bowel sounds.      Extremity: 1-2+ pitting pedal edema    Neurologic: The patient is alert and oriented.      Skin: No rash, no obvious lesions noted.      Psychiatric: The patient appears to be emotionally stable.      INTERPRETATION OF TELEMETRY:  atrial flutter    ECG: atrial flutter with 2:1 conduction.    I&O's Detail      LABS:                        14.7   17.0  )-----------( 245      ( 02 May 2017 14:36 )             43.0     05-02    138  |  100  |  45<H>  ----------------------------<  162<H>  4.5   |  26  |  1.41<H>    Ca    9.3      02 May 2017 14:36  Mg     2.3     05-02    TPro  6.8  /  Alb  3.5  /  TBili  1.0  /  DBili  x   /  AST  27  /  ALT  40  /  AlkPhos  94  05-02    CARDIAC MARKERS ( 02 May 2017 14:36 )  4.170 ng/mL / x     / 48 U/L / x     / x              I&O's Summary    BNPSerum Pro-Brain Natriuretic Peptide: 83054 pg/mL (05-02 @ 14:36)    RADIOLOGY & ADDITIONAL STUDIES: Patient is a 66y old  Male who presents with a chief complaint of SOB.    HPI:66yom with prior medical hx of Cardiac amyloid, HTN, CADEN presented with worsening SOB and WELL[place] and worsening pedal edema in last 3 days more than his baseline.  No orthopnea or PND, using CPAP at night.      PAST MEDICAL & SURGICAL HISTORY:  Amyloidosis  CHF (congestive heart failure)  No significant past surgical history      MEDICATIONS  (STANDING):  furosemide   Injectable 40milliGRAM(s) IV Push Once    MEDICATIONS  (PRN):      FAMILY HISTORY:  No family history of premature CAD or SCD.        SOCIAL HISTORY:  non smoker, no alcohol use      REVIEW OF SYSTEMS:  CONSTITUTIONAL:  No night sweats.  No fatigue, malaise, lethargy.  No fever or chills.  HEENT:  Eyes:  No visual changes.  No eye pain.      ENT:  No runny nose.  No epistaxis.  No sinus pain.  No sore throat.  No odynophagia.  No ear pain.  No congestion.  RESPIRATORY:  No cough.  No wheeze.  No hemoptysis.  c/o shortness of breath.  CARDIOVASCULAR:  No chest pains.  No palpitations. c/o  shortness of breath, No orthopnea or PND. c/o pedal edema  GASTROINTESTINAL:  No abdominal pain.  No nausea or vomiting.  No diarrhea or constipation.  No hematemesis.  No hematochezia.  No melena.  GENITOURINARY:  No urgency.  No frequency.  No dysuria.  No hematuria.  No obstructive symptoms.  No discharge.  No pain.  No significant abnormal bleeding.  MUSCULOSKELETAL:  No musculoskeletal pain.  No joint swelling.  No arthritis.  NEUROLOGICAL:  No tingling or numbness or weakness.  PSYCHIATRIC:  No confusion  SKIN:  No rashes.  No lesions.  No wounds.  ENDOCRINE:  No unexplained weight loss.  No polydipsia.  No polyuria.  No polyphagia.  HEMATOLOGIC:  No anemia.  No purpura.  No petechiae.  No prolonged or excessive bleeding.   ALLERGIC AND IMMUNOLOGIC:  No pruritus.  No swelling.         Vital Signs Last 24 Hrs  T(C): 37, Max: 37 (05-02 @ 14:32)  T(F): 98.6, Max: 98.6 (05-02 @ 14:32)  HR: 106 (106 - 106)  BP: 96/58 (96/58 - 96/58)  BP(mean): --  RR: 20 (20 - 20)  SpO2: 99% (99% - 99%)    PHYSICAL EXAM-    Constitutional: The patient appears to be normal, well developed, well nourished and alert and oriented to time, place and person. The patient  appears acutely ill.     Head: Head is normocephalic and atraumatic.      Neck: The patient's neck is supple without enlargement, has no palpable thyromegaly nor thyroid nodules. No audible carotid bruits. There are strong carotid pulses bilaterally. positive JVD    Cardiovascular: Regular rate and rhythm without S3, S4. No murmurs or rubs are appreciated.      Respiratory: crackles b/l basal    Abdomen: Soft, nontender, nondistended with positive bowel sounds.      Extremity: 1-2+ pitting pedal edema    Neurologic: The patient is alert and oriented.      Skin: No rash, no obvious lesions noted.      Psychiatric: The patient appears to be emotionally stable.      INTERPRETATION OF TELEMETRY:  atrial flutter    ECG: atrial flutter with 2:1 conduction.    I&O's Detail      LABS:                        14.7   17.0  )-----------( 245      ( 02 May 2017 14:36 )             43.0     05-02    138  |  100  |  45<H>  ----------------------------<  162<H>  4.5   |  26  |  1.41<H>    Ca    9.3      02 May 2017 14:36  Mg     2.3     05-02    TPro  6.8  /  Alb  3.5  /  TBili  1.0  /  DBili  x   /  AST  27  /  ALT  40  /  AlkPhos  94  05-02    CARDIAC MARKERS ( 02 May 2017 14:36 )  4.170 ng/mL / x     / 48 U/L / x     / x              I&O's Summary    BNPSerum Pro-Brain Natriuretic Peptide: 91791 pg/mL (05-02 @ 14:36)    RADIOLOGY & ADDITIONAL STUDIES:

## 2017-05-03 NOTE — DISCHARGE NOTE FOR THE EXPIRED PATIENT - HOSPITAL COURSE
PAtient was admitted through the ED.  He presented with the CC of SOB.  In the ED the SOB worsened and required intubation and a brief period of CPT.  These Same episodes continued a total of 5 times.  The last time CPR and Chemical resuscitation failed and he remained pulseless.  The patients daughter present and aware    The ME will be called  Organ donation to be called as well

## 2017-05-03 NOTE — PROVIDER CONTACT NOTE (CHANGE IN STATUS NOTIFICATION) - ACTION/TREATMENT ORDERED:
As per Tressa Pt is not a candidate for any donation
As per Donald Mai pt is not an Medical examiner case

## 2017-05-03 NOTE — PATIENT PROFILE ADULT. - NS PRO OT REFERRAL QUES 2 YN
Rest, ice, elevate right upper extremity at rest.  Use a wrist brace for support.  Use medication as prescribed.  Follow-up to primary care physician in one to 2 days for a recheck.  Return to the ER as needed.  
no

## 2017-05-03 NOTE — PROGRESS NOTE ADULT - SUBJECTIVE AND OBJECTIVE BOX
Code Blue    Patient developed Bradycardia and then asystole.  CPR started and again after 2 EPI a pulse regained.        Allergies    No Known Allergies    Intolerances        Weight (kg): 82.6 ( @ 17:09)  BMI (kg/m2): 26.9 ( @ 17:09)    ICU Vital Signs Last 24 Hrs  T(C): 36.9, Max: 37 ( @ 14:32)  T(F): 98.5, Max: 98.6 ( @ 14:32)  HR: 110 (82 - 124)  BP: 151/103 (62/35 - 151/103)  BP(mean): --  ABP: --  ABP(mean): --  RR: 12 (12 - 32)  SpO2: 100% (88% - 100%)      Mode: AC/ CMV (Assist Control/ Continuous Mandatory Ventilation)  RR (machine): 12  TV (machine): 600  FiO2: 100  PEEP: 5  ITime: 1  PIP: 39      I&O's Summary    I & Os for current day (as of 03 May 2017 03:11)  =============================================  IN: 500 ml / OUT: 105 ml / NET: 395 ml      Date/Time:  @ 00:02  Hematocrit: 46.0 % [39.0 - 50.0]  Hemoglobin: 15.2 g/dL [13.0 - 17.0]  WBC: 31.2 K/uL<H> [3.8 - 10.5]  Platlets: 238 K/uL [150 - 400]  ALT/SGPT: --  Albumin: --  Alk Phos: --  Anion Gap: --  AST/SGOT: --  Bilirubin Direct: --  Bilirubin Total: --  BUN: --  Ca: --  CO2: --  Cl: --  Creatinine: --  eGFR : --  eGFR Non-African American: --  Glucose: --  K: --  Protein Total: --  Na: --  Mg: --  Phosphorus: --  Lactate: --  Date/Time:  @ 22:15  Hematocrit: --  Hemoglobin: --  WBC: --  Platlets: --  ALT/SGPT: --  Albumin: --  Alk Phos: --  Anion Gap: 13 mmol/L [5 - 17]  AST/SGOT: --  Bilirubin Direct: --  Bilirubin Total: --  BUN: 48 mg/dL<H> [7 - 23]  Ca: 7.8 mg/dL<L> [8.5 - 10.1]  CO2: 24 mmol/L [22 - 31]  Cl: 104 mmol/L [96 - 108]  Creatinine: 1.66 mg/dL<H> [0.50 - 1.30]  eGFR : 50 mL/min/1.73M2<L>  eGFR Non-: 43 mL/min/1.73M2<L>  Glucose: 217 mg/dL<H> [70 - 99]  K: 4.1 mmol/L [3.5 - 5.3]  Protein Total: --  Na: 141 mmol/L [135 - 145]  Mg: --  Phosphorus: --  Lactate: 5.8 mmol/L<HH> [0.7 - 2.0]  Date/Time:  @ 14:36  Hematocrit: 43.0 % [39.0 - 50.0]  Hemoglobin: 14.7 g/dL [13.0 - 17.0]  WBC: 17.0 K/uL<H> [3.8 - 10.5]  Platlets: 245 K/uL [150 - 400]  ALT/SGPT: 40 U/L [12 - 78]  Albumin: 3.5 g/dL [3.3 - 5.0]  Alk Phos: 94 U/L [40 - 120]  Anion Gap: 12 mmol/L [5 - 17]  AST/SGOT: 27 U/L [15 - 37]  Bilirubin Direct: --  Bilirubin Total: 1.0 mg/dL [0.2 - 1.2]  BUN: 45 mg/dL<H> [7 - 23]  Ca: 9.3 mg/dL [8.5 - 10.1]  CO2: 26 mmol/L [22 - 31]  Cl: 100 mmol/L [96 - 108]  Creatinine: 1.41 mg/dL<H> [0.50 - 1.30]  eGFR : 61 mL/min/1.73M2  eGFR Non-: 52 mL/min/1.73M2<L>  Glucose: 162 mg/dL<H> [70 - 99]  K: 4.5 mmol/L [3.5 - 5.3]  Protein Total: 6.8 gm/dL [6.0 - 8.3]  Na: 138 mmol/L [135 - 145]  M.3 mg/dL [1.8 - 2.4]  Phosphorus: --  Lactate: 4.6 mmol/L<HH> [0.7 - 2.0]      CARDIAC MARKERS ( 03 May 2017 00:02 )  5.430 ng/mL / x     / x     / x     / x      CARDIAC MARKERS ( 02 May 2017 22:15 )  4.270 ng/mL / x     / 121 U/L / x     / x      CARDIAC MARKERS ( 02 May 2017 14:36 )  4.170 ng/mL / x     / 48 U/L / x     / x            ABG - ( 02 May 2017 22:23 )  pH: 7.24  /  pCO2: 53    /  pO2: 170   / HCO3: 22    / Base Excess: -6    /  SaO2: 98       A/P Cardiac arrest    Plan:  CCU  Will change to EPI drip  He does not appear to go into heart block  Will change to an EPI drip  Called EP Dr. Palma and he does not feel a wire is warranted at this time.. If this continues to occure it may be necessary  Will wean off Levo                DVT Prophylaxis:    Advanced Directives:  Discussed with:    Visit Information: Time spent 45 min excluding CPR    ** Time is exclusive of billed procedures and/or teaching and/or routine family updates.

## 2017-05-07 LAB
CULTURE RESULTS: SIGNIFICANT CHANGE UP
CULTURE RESULTS: SIGNIFICANT CHANGE UP
SPECIMEN SOURCE: SIGNIFICANT CHANGE UP
SPECIMEN SOURCE: SIGNIFICANT CHANGE UP

## 2017-05-08 DIAGNOSIS — R00.1 BRADYCARDIA, UNSPECIFIED: ICD-10-CM

## 2017-05-08 DIAGNOSIS — G47.33 OBSTRUCTIVE SLEEP APNEA (ADULT) (PEDIATRIC): ICD-10-CM

## 2017-05-08 DIAGNOSIS — I43 CARDIOMYOPATHY IN DISEASES CLASSIFIED ELSEWHERE: ICD-10-CM

## 2017-05-08 DIAGNOSIS — I50.23 ACUTE ON CHRONIC SYSTOLIC (CONGESTIVE) HEART FAILURE: ICD-10-CM

## 2017-05-08 DIAGNOSIS — E85.4 ORGAN-LIMITED AMYLOIDOSIS: ICD-10-CM

## 2017-05-08 DIAGNOSIS — Q21.1 ATRIAL SEPTAL DEFECT: ICD-10-CM

## 2017-05-08 DIAGNOSIS — E87.2 ACIDOSIS: ICD-10-CM

## 2017-05-08 DIAGNOSIS — J96.91 RESPIRATORY FAILURE, UNSPECIFIED WITH HYPOXIA: ICD-10-CM

## 2017-05-08 DIAGNOSIS — I11.0 HYPERTENSIVE HEART DISEASE WITH HEART FAILURE: ICD-10-CM

## 2017-05-08 DIAGNOSIS — I46.9 CARDIAC ARREST, CAUSE UNSPECIFIED: ICD-10-CM

## 2017-05-08 DIAGNOSIS — I48.92 UNSPECIFIED ATRIAL FLUTTER: ICD-10-CM

## 2017-05-08 DIAGNOSIS — I48.91 UNSPECIFIED ATRIAL FIBRILLATION: ICD-10-CM

## 2017-05-19 NOTE — CDI QUERY NOTE - NSCDIOTHERTXTBX_GEN_ALL_CORE_HH
1) Patient admitted with SOB. In ac on ch systolic CHF. In ER hypotensive and required intubation for respiratory failure.  BP in ER= 96/58 then BP = 94/62 > 82/63 > 114/71 > 88/66 > 113/80 > 84/61.  On 5/3 BP= 82/31 > 84/49 > 106/75 > 77/47 > 55/20 > 107/79 > 84/52.. Patient was placed on a levophed drip.  Are the above clinical indicators indicative of a further diagnosis ?  ie.. Cardiogenic shock ? Other Shock ? No clinical evidence of Shock ?    2) Elevated troponins- likely from demand from heart failure or NSTEMI- started on heparin drip. Troponin levels= 4.17 > 4.27 > 5.43    Please clarify if the above troponin levels are indicative of a diagnosis ?  ie.. NSTEMI ? Other MI ? Demand ischemia ? Other ?

## 2017-05-20 NOTE — CHART NOTE - NSCHARTNOTEFT_GEN_A_CORE
It was not clear what type of the shock the patient had    He did have a NSTEMI based on the elevated troponins

## 2021-02-24 NOTE — ED PROVIDER NOTE - MEDICAL DECISION MAKING DETAILS
Recommendation Preamble: The following recommendations were made during the visit: Detail Level: Zone Recommendations (Free Text): Vitamin C serum \\nNUETROGENA hydroboost Render Risk Assessment In Note?: no 67 y/o M with a hx of CHF secondary to amyloidosis presents to ED c/o difficulty breathing, worsening WELL[place]. Labs, EKG
